# Patient Record
(demographics unavailable — no encounter records)

---

## 2025-06-12 NOTE — HISTORY OF PRESENT ILLNESS
[de-identified] : Olivia presented for a sick visit due to a cough and congestion that began on Sunday. She reported that her condition is worsening. Additionally, she mentioned experiencing clogged ears, yellow mucus discharge when blowing her nose, and a sensation of tightness and heaviness in her chest when she coughs.

## 2025-06-12 NOTE — HISTORY OF PRESENT ILLNESS
[de-identified] : Olivia presented for a sick visit due to a cough and congestion that began on Sunday. She reported that her condition is worsening. Additionally, she mentioned experiencing clogged ears, yellow mucus discharge when blowing her nose, and a sensation of tightness and heaviness in her chest when she coughs.